# Patient Record
(demographics unavailable — no encounter records)

---

## 2024-10-24 NOTE — REASON FOR VISIT
[Follow-Up: _____] : a [unfilled] follow-up visit [Home] : at home, [unfilled] , at the time of the visit. [Medical Office: ([facility])___] : at the medical office located in  [Patient] : the patient [Self] : self

## 2024-10-24 NOTE — HISTORY OF PRESENT ILLNESS
[> 3 months] : more  than 3 months [de-identified] : Ms. Hurst is a pleasant 50yo female who presents today for follow up of IIH and pulsatile tinnitus.  The LEFT pulsatile tinnitus started in 2003 and has been getting progressively worse since that time.  It improves with left neck pressure and gets worse with opposite (right) head turning.   In 2003, she was diagnosed with pseudotumor and took Diamox x 3 months and then stopped after symptoms resolved. Had spinal tap (does not recall OP).  In 2020, she saw Dr. Luna who obtained another MRV revealing venous stenosis. No papilledema at that time. Took Diamox for a brief time but then stopped.  Currently, she denies any headaches or blurry vision.   Since our last visit, she started to notice the pulsatile tinnitus in her right ear as well.  She was found to be very anemic and started iron infusions.  She had an eye exam with Dr. Luna, pending records.  She also had a lumbar puncture, unsure of opening pressure, will send report.

## 2025-03-14 NOTE — HISTORY OF PRESENT ILLNESS
[FreeTextEntry1] : Ms. GIVENS is a pleasant 50yo female who presents today after successful venous sinus stenting on 2/10/25 by Dr. Cardoza.  The patient went home the same day in good condition.  Her pulsatile tinnitus has completely resolved.  She denies significant headaches or vision changes.  Prior to the procedure, she saw her gynecologist and received the depo-provera injection to help with heavy periods.  She states that about a week after the procedure, she started having vaginal bleeding that persists.  She is following up with gyn and hematology.  She had a CBC with mild anemia last week and is following up again today.  She stopped her plavix on 3/7/25, after discussed with Dr. Cardoza.    She remains compliant with ASA 325mg daily.

## 2025-03-14 NOTE — ASSESSMENT
[FreeTextEntry1] : Impression: s/p Successful Venous Sinus Stenting for Pulsatile Tinnitus Pulsatile Tinnitus Completely Resolved Following with Hematology/Gynecology for her Anemia No Papilledema Prior to Procedure - No follow up with Dr. Luna  Will continue routine follow up.    Plan: Continue ASA 325mg Daily Follow Up with Dr. Luna MRV Head w/wo in 2/2026 Follow Up with Me After Imaging

## 2025-03-14 NOTE — REASON FOR VISIT
[Follow-Up: _____] : a [unfilled] follow-up visit [Home] : at home, [unfilled] , at the time of the visit. [Medical Office: ([facility])___] : at the medical office located in  [Telehealth (audio & video)] : This visit was provided via telehealth using real-time 2-way audio visual technology. [Verbal consent obtained from patient] : the patient, [unfilled]

## 2025-03-14 NOTE — REASON FOR VISIT
[Follow-Up: _____] : a [unfilled] follow-up visit [Home] : at home, [unfilled] , at the time of the visit. [Medical Office: ([facility])___] : at the medical office located in  [Telehealth (audio & video)] : This visit was provided via telehealth using real-time 2-way audio visual technology. [Verbal consent obtained from patient] : the patient, [unfilled]